# Patient Record
Sex: FEMALE | Race: BLACK OR AFRICAN AMERICAN | Employment: UNEMPLOYED | URBAN - METROPOLITAN AREA
[De-identification: names, ages, dates, MRNs, and addresses within clinical notes are randomized per-mention and may not be internally consistent; named-entity substitution may affect disease eponyms.]

---

## 2017-02-15 ENCOUNTER — TELEPHONE (OUTPATIENT)
Dept: NURSING | Facility: CLINIC | Age: 22
End: 2017-02-15

## 2017-02-15 NOTE — TELEPHONE ENCOUNTER
Call Type: Triage Call    Presenting Problem: Arlecia states she had a small amount of brown  vaginal discharge today. She says her menses should start tomorrow.  Denies pain, or any other symptoms now. Advised that is likely the  start of her cycle, one day sooner than expected.  Triage Note:  Guideline Title: Information Only Call; No Symptom Triage (Adult)  Recommended Disposition: Provide Information or Advice Only  Original Inclination: Wanted to speak with a nurse  Override Disposition:  Intended Action: Follow advice given  Physician Contacted: No  Health information question; person denies any symptoms, no triage required.  Information provided from approved references or clinical experience. ?  YES  Requesting regular office appointment ? NO  Sign(s) or symptom(s) associated with a diagnosed condition or with a new illness  ? NO  Requesting information about provider, services or community resources ? NO  Call back to complete assessment/clarification of information from prior caller to  complete triage ? NO  Requesting information and provider is best resource; no triage required. ? NO  Caller not with patient and is unable to provide clinical information about  patient to facilitate triage. ? NO  Requesting provider information for recently scheduled test, procedure; no triage  required. Needed information not available per approved resources or clinical  experience. ? NO  Requesting information not available per approved reference or clinical  experience; no triage required. ? NO  Requesting information regarding scheduled exam, test or procedure; no triage  required. Information provided from approved resources or clinical experience. ?  NO  Physician Instructions:  Care Advice:

## 2017-03-21 ENCOUNTER — TELEPHONE (OUTPATIENT)
Dept: NURSING | Facility: CLINIC | Age: 22
End: 2017-03-21

## 2017-03-21 NOTE — TELEPHONE ENCOUNTER
Call Type: Triage Call    Presenting Problem: Pt calling. States her period is a week late -  but had negative home preg test. No abd pain, vag dc/bleeding.  Denies any stress at home/work/school. States she did have  unprotected sex since LMP. Goes to [facility] for primary care.  Triage Note:  Krystal Brandon added this note on Mar 21 2017  2:24PM:  Advised to call her clinic and consult re: specific recommendations.  Reviewed worsening/emergent symptoms that would require immediate eval/ER.  Pt verbalized understanding and is agreeable to plan.  Guideline Title: Amenorrhea  Recommended Disposition: See Provider within 2 Weeks  Original Inclination: Wanted to speak with a nurse  Override Disposition:  Intended Action: Follow advice given  Physician Contacted: No  All other situations ?  YES  Possibly pregnant ? NO  Sexually active AND ectopic pregnancy risk factors ? NO  Pregnant or possibly pregnant AND abdominal pain ? NO  Concern with weight AND less than 90% of ideal body weight (100 lbs. for first 5  feet of height plus 5 lbs. for each additional inch) ? NO  Regular strenuous or heavy exercise ? NO  New or worsening signs and symptoms that may indicate shock ? NO  Any other abdominal pain ? NO  Age more than 40 years OR hysterectomy ? NO  Unbearable abdominal/pelvic pain ? NO  Three or more months without a period (previously having menses) AND not  previously evaluated ? NO  No period more than 3 months postpartum AND not breastfeeding ? NO  No period more than 3 months after stopping breastfeeding ? NO  No period less than 3 months postpartum or less than 3 months after stopping  breastfeeding ? NO  Taking oral contraceptives AND absence of menses on pill OR stopped oral  contraceptives within last 6 months ? NO  Under significant personal stress ? NO  Has not had a period by the age of by the age of 18 years or age of 16 years with  the absence of breast development. ? NO  Physician  Instructions:  Care Advice: If pregnancy is possible, do a home pregnancy test.  Call your  provider if the results are positive and to get further recommendations. It  is important that prenatal care is started as soon as possible if you are  pregnant.

## 2017-06-05 ENCOUNTER — APPOINTMENT (OUTPATIENT)
Dept: GENERAL RADIOLOGY | Facility: CLINIC | Age: 22
End: 2017-06-05
Attending: PHYSICIAN ASSISTANT
Payer: COMMERCIAL

## 2017-06-05 ENCOUNTER — HOSPITAL ENCOUNTER (EMERGENCY)
Facility: CLINIC | Age: 22
Discharge: HOME | End: 2017-06-05
Attending: PHYSICIAN ASSISTANT | Admitting: PHYSICIAN ASSISTANT
Payer: COMMERCIAL

## 2017-06-05 VITALS
SYSTOLIC BLOOD PRESSURE: 114 MMHG | TEMPERATURE: 98.1 F | HEART RATE: 86 BPM | OXYGEN SATURATION: 100 % | DIASTOLIC BLOOD PRESSURE: 55 MMHG | RESPIRATION RATE: 18 BRPM

## 2017-06-05 DIAGNOSIS — R07.9 CHEST PAIN, UNSPECIFIED TYPE: ICD-10-CM

## 2017-06-05 DIAGNOSIS — R10.84 ABDOMINAL PAIN, GENERALIZED: ICD-10-CM

## 2017-06-05 LAB
ALBUMIN SERPL-MCNC: 4 G/DL (ref 3.4–5)
ALP SERPL-CCNC: 105 U/L (ref 40–150)
ALT SERPL W P-5'-P-CCNC: 19 U/L (ref 0–50)
ANION GAP SERPL CALCULATED.3IONS-SCNC: 1 MMOL/L (ref 3–14)
AST SERPL W P-5'-P-CCNC: 19 U/L (ref 0–45)
BASOPHILS # BLD AUTO: 0.1 10E9/L (ref 0–0.2)
BASOPHILS NFR BLD AUTO: 1.1 %
BILIRUB SERPL-MCNC: 0.3 MG/DL (ref 0.2–1.3)
BUN SERPL-MCNC: 6 MG/DL (ref 7–30)
CALCIUM SERPL-MCNC: 9.3 MG/DL (ref 8.5–10.1)
CHLORIDE SERPL-SCNC: 107 MMOL/L (ref 94–109)
CO2 SERPL-SCNC: 31 MMOL/L (ref 20–32)
CREAT SERPL-MCNC: 0.57 MG/DL (ref 0.52–1.04)
D DIMER PPP FEU-MCNC: 0.4 UG/ML FEU (ref 0–0.5)
DIFFERENTIAL METHOD BLD: NORMAL
EOSINOPHIL # BLD AUTO: 0.1 10E9/L (ref 0–0.7)
EOSINOPHIL NFR BLD AUTO: 1.8 %
ERYTHROCYTE [DISTWIDTH] IN BLOOD BY AUTOMATED COUNT: 12.9 % (ref 10–15)
GFR SERPL CREATININE-BSD FRML MDRD: ABNORMAL ML/MIN/1.7M2
GLUCOSE SERPL-MCNC: 81 MG/DL (ref 70–99)
HCT VFR BLD AUTO: 38.5 % (ref 35–47)
HGB BLD-MCNC: 12.6 G/DL (ref 11.7–15.7)
IMM GRANULOCYTES # BLD: 0 10E9/L (ref 0–0.4)
IMM GRANULOCYTES NFR BLD: 0.2 %
LIPASE SERPL-CCNC: 92 U/L (ref 73–393)
LYMPHOCYTES # BLD AUTO: 2.5 10E9/L (ref 0.8–5.3)
LYMPHOCYTES NFR BLD AUTO: 40.9 %
MCH RBC QN AUTO: 27.3 PG (ref 26.5–33)
MCHC RBC AUTO-ENTMCNC: 32.7 G/DL (ref 31.5–36.5)
MCV RBC AUTO: 84 FL (ref 78–100)
MONOCYTES # BLD AUTO: 0.4 10E9/L (ref 0–1.3)
MONOCYTES NFR BLD AUTO: 7 %
NEUTROPHILS # BLD AUTO: 3 10E9/L (ref 1.6–8.3)
NEUTROPHILS NFR BLD AUTO: 49 %
NRBC # BLD AUTO: 0 10*3/UL
NRBC BLD AUTO-RTO: 0 /100
PLATELET # BLD AUTO: 206 10E9/L (ref 150–450)
POTASSIUM SERPL-SCNC: 4 MMOL/L (ref 3.4–5.3)
PROT SERPL-MCNC: 7.9 G/DL (ref 6.8–8.8)
RBC # BLD AUTO: 4.61 10E12/L (ref 3.8–5.2)
SODIUM SERPL-SCNC: 139 MMOL/L (ref 133–144)
TROPONIN I SERPL-MCNC: NORMAL UG/L (ref 0–0.04)
WBC # BLD AUTO: 6.1 10E9/L (ref 4–11)

## 2017-06-05 PROCEDURE — 84484 ASSAY OF TROPONIN QUANT: CPT | Performed by: PHYSICIAN ASSISTANT

## 2017-06-05 PROCEDURE — 80053 COMPREHEN METABOLIC PANEL: CPT | Performed by: PHYSICIAN ASSISTANT

## 2017-06-05 PROCEDURE — 85379 FIBRIN DEGRADATION QUANT: CPT | Performed by: PHYSICIAN ASSISTANT

## 2017-06-05 PROCEDURE — 99285 EMERGENCY DEPT VISIT HI MDM: CPT | Mod: 25

## 2017-06-05 PROCEDURE — 71020 XR CHEST 2 VW: CPT

## 2017-06-05 PROCEDURE — 25000128 H RX IP 250 OP 636: Performed by: PHYSICIAN ASSISTANT

## 2017-06-05 PROCEDURE — 83690 ASSAY OF LIPASE: CPT | Performed by: PHYSICIAN ASSISTANT

## 2017-06-05 PROCEDURE — 85025 COMPLETE CBC W/AUTO DIFF WBC: CPT | Performed by: PHYSICIAN ASSISTANT

## 2017-06-05 PROCEDURE — 93005 ELECTROCARDIOGRAM TRACING: CPT

## 2017-06-05 PROCEDURE — 96365 THER/PROPH/DIAG IV INF INIT: CPT

## 2017-06-05 RX ORDER — HYDROCODONE BITARTRATE AND ACETAMINOPHEN 5; 325 MG/1; MG/1
1-2 TABLET ORAL EVERY 4 HOURS PRN
Qty: 15 TABLET | Refills: 0 | Status: SHIPPED | OUTPATIENT
Start: 2017-06-05 | End: 2017-08-23

## 2017-06-05 RX ORDER — ONDANSETRON 4 MG/1
4 TABLET, ORALLY DISINTEGRATING ORAL EVERY 8 HOURS PRN
Qty: 10 TABLET | Refills: 0 | Status: SHIPPED | OUTPATIENT
Start: 2017-06-05 | End: 2017-06-08

## 2017-06-05 RX ORDER — SODIUM CHLORIDE 9 MG/ML
1000 INJECTION, SOLUTION INTRAVENOUS CONTINUOUS
Status: DISCONTINUED | OUTPATIENT
Start: 2017-06-05 | End: 2017-06-05 | Stop reason: HOSPADM

## 2017-06-05 RX ORDER — CEFTRIAXONE 1 G/1
1 INJECTION, POWDER, FOR SOLUTION INTRAMUSCULAR; INTRAVENOUS ONCE
Status: COMPLETED | OUTPATIENT
Start: 2017-06-05 | End: 2017-06-05

## 2017-06-05 RX ADMIN — SODIUM CHLORIDE 100 ML: 900 INJECTION INTRAVENOUS at 16:01

## 2017-06-05 RX ADMIN — CEFTRIAXONE SODIUM 1 G: 1 INJECTION, POWDER, FOR SOLUTION INTRAMUSCULAR; INTRAVENOUS at 16:01

## 2017-06-05 ASSESSMENT — ENCOUNTER SYMPTOMS
FLANK PAIN: 1
COUGH: 0
BACK PAIN: 1
NAUSEA: 0
DYSURIA: 0
ABDOMINAL PAIN: 1
DIARRHEA: 1
APPETITE CHANGE: 0
SHORTNESS OF BREATH: 0
VOMITING: 0
FEVER: 0

## 2017-06-05 NOTE — ED AVS SNAPSHOT
[facility] Emergency Department    [address]    [facility] [address]    Phone:  [phone]    Fax:  [phone]                                       Kina Walsh   MRN: [id number]    Department:  [facility] Emergency Department   Date of Visit:  6/5/2017           After Visit Summary Signature Page     I have received my discharge instructions, and my questions have been answered. I have discussed any challenges I see with this plan with the nurse or doctor.    ..........................................................................................................................................  Patient/Patient Representative Signature      ..........................................................................................................................................  Patient Representative Print Name and Relationship to Patient    ..................................................               ................................................  Date                                            Time    ..........................................................................................................................................  Reviewed by Signature/Title    ...................................................              ..............................................  Date                                                            Time

## 2017-06-05 NOTE — ED NOTES
Patient presents to the ED with lower back and abdominal pain. Symptoms intermittently for the past few days.

## 2017-06-05 NOTE — DISCHARGE INSTRUCTIONS
You can try using antacids if you'd like to see if that helps your abdominal pain. Follow up with your doctor later this week or early next week for recheck. Return here with concerns.     Abdominal Pain  Abdominal pain is pain in the stomach or intestinal area. Everyone has this pain from time to time. In many cases it goes away on its own. But abdominal pain can sometimes be due to a serious problem, such as appendicitis. So it s important to know when to seek help.  Causes of abdominal pain  There are many possible causes of abdominal pain. Common causes in adults include:    Constipation, diarrhea, or gas    GERD (gastroesophageal reflux disease) movement of stomach acid into the esophagus, also known as acid reflux or heartburn    Peptic ulcer (a sore in the lining of the stomach or small intestine)    Inflammation of the gallbladder, liver, or pancreas    Gallstones or kidney stones    Appendicitis     Obstruction of the intestines     Hernia (bulging of an internal organ through a muscle or other tissue)    Urinary tract infections    In women, menstrual cramps, fibroids, or endometriosis of the uterus    Inflammation or infection of the intestines  Diagnosing the cause of abdominal pain  Your health care provider will examine you to help find the cause of your pain. If needed, tests will be ordered. Because abdominal pain has so many possible causes, it can be hard to discover the reason for the pain. Giving details about your pain can help. Be ready to tell your health care provider where and when you feel the pain and what makes it better or worse. Also mention whether you have other symptoms such as fever, tiredness, nausea, vomiting, or changes in bathroom habits.  Treating abdominal pain  Certain causes of pain, such as appendicitis or a bowel obstruction, need emergency treatment. Other problems can be treated with rest, fluids, or medications. Your health care provider can give you specific  instructions for treatment or self-care based on the cause of your pain.  If you have vomiting or diarrhea, sip water or other clear fluids. When you are ready to eat solid foods again, start with small amounts of easy-to-digest, low-fat foods, such as applesauce, toast, or crackers.   When to call the doctor  Call 911 or go to the hospital right away if you:    Can t pass stool and are vomiting    Are vomiting blood or have black, tarry diarrhea    Also have chest, neck, or shoulder pain    Feel like you are about to pass out    Have pain in your shoulder blades with nausea    Have sudden, excruciating abdominal pain    Have new, severe pain unlike any you have felt before    Have a belly that is rigid, hard, and tender to touch  Call your doctor if you have:    Pain for more than 5 days    Bloating for more than 2 days    Diarrhea for more than 5 days    Fever of 101 F (38.3 C) or higher    Pain that continues to worsen    Unexplained weight loss    Continued lack of appetite    Blood in the stool  How to prevent abdominal pain  Here are some tips to help prevent abdominal pain:    Eat smaller amounts of food at one time.    Avoid greasy, fried, or other high-fat foods.    Avoid foods that give you gas.    Exercise regularly.    Drink plenty of fluids.  To help prevent symptoms of gastroesophageal reflux disease (GERD):    Quit smoking.    Reduce alcohol and certain foods that increase stomach acid.     Lose excess weight.    Finish eating at least 2 hours before you go to bed or lie down.    Elevate the head of your bed.    [id number] [employer]. [address] [place] [address], [place] [address]. All rights reserved. This information is not intended as a substitute for professional medical care. Always follow your healthcare professional's instructions.

## 2017-06-05 NOTE — ED PROVIDER NOTES
History     Chief Complaint:  Back and Abdominal Pain also chest pain      HPI   Kina Walsh is a 22 year old female with a history of HIV with no viral load on testing performed end of April 2017 who presents with back and abdominal pain as well as chest pain.   The patient states she has had odorous urine for the past month and was diagnosed with a UTI yesterday at OBGYN's office. They gave her prescription for medication to treat this but she has not started it yet. For the past 2 days she has had all over back and abdominal pain, worse in the left mid back and upper abdomen. She has had the upper abdominal pain before but never had the back pain. LMP was last week. She denies vaginal bleeding or discharge, dysuria, vomiting, nausea. The patient had diarrhea last night but none since then. Food does not change the pain.     The patient also has had chest pain since last night that is intermittent. She denies shortness of breath, worsening of chest pain with exertion. She denies personal or family history of cardiac issues. She denies leg swelling, recent travel, surgeries or period of immobilizations, history of blood clotting or bleeding disorders in her or family. She did use an estrogen birth control patch for 1 day last month as birth control but then it fell off in the shower and she did not put another one on.     UA performed at OBGYN clinic on 6/2/17: Hazy, LE trace, blood small, Epithelial cells few, Bacteria moderate  UPT: negative  Negative wet prep    Allergies:  No known allergies     Medications:    Triumeq    Past Medical History:    Asymptomatic HIV    Past Surgical History:    No past surgical history on file.    Family History:    No family history on file.    Social History:  The patient presents with significant other  She is a current everyday smoker and drinks alcohol daily  Marital Status:  Single [1]     Review of Systems   Constitutional: Negative for appetite change and fever.    Respiratory: Negative for cough and shortness of breath.    Cardiovascular: Positive for chest pain.   Gastrointestinal: Positive for abdominal pain and diarrhea. Negative for nausea and vomiting.   Genitourinary: Positive for flank pain. Negative for dysuria, vaginal bleeding and vaginal discharge.   Musculoskeletal: Positive for back pain.   All other systems reviewed and are negative.    Physical Exam   First Vitals:  BP: 117/71  Pulse: 86  Temp: 98.1  F (36.7  C)  Resp: 18  SpO2: 96 %    Physical Exam  Constitutional: Alert, attentive  HENT:    Nose: Nose normal.    Mouth/Throat: Oropharynx is clear, mucous membranes are moist   Eyes: EOM are normal. Pupils are equal, round, and reactive to light.   CV: regular rate and rhythm  Chest: Effort normal and breath sounds normal.   GI:  There is generalized abdominal tenderness worse in the epigastrium. No distension. Normal bowel sounds  MSK: Normal range of motion. No leg swelling.   Neurological: Alert, attentive  Skin: Skin is warm and dry.     Emergency Department Course   ECG:  Indication: chest pain Rate 74 bpm. ID interval 148. QRS duration 98. QT/QTc 382/424. P-R-T axes 68 72 38. Normal sinus rhythm with sinus arrhythmia. Normal ECG. Performed at 1408. Read by Dr. Crystal at 1450.       Imaging:  Radiographic findings were communicated with the patient and family who voiced understanding of the findings.  CXR: The cardiac silhouette and pulmonary vasculature are normal. No evidence of pneumothorax or pleural effusion. The lungs are clear, per radiology.     Laboratory:  CBC: WNL(WBC 6.1, HGB 12.6, Plts 206)  CMP: Anion gap 1 low, BUN 6 low, o/w WNL(Creatinine 0.57)  Lipase: 92  D dimer: 0.4  Troponin: <0.015    Interventions:  NS 1 L IV  Ceftriaxone 1 g IV    Emergency Department Course:  I evaluated the patient and discussed a plan of care with the patient and significant other  IV inserted and blood drawn. The patient was placed on continuous cardiac  monitoring and pulse oximetry.   The patient was sent for the following imaging: CXR. See results above.     Rechecked the patient, findings and plan explained to the patient. Patient discharged home, status improved, with instructions regarding supportive care, medications, and reasons to return as well as the importance of close follow-up was reviewed.     Impression & Plan      Medical Decision Making:  Kina Walsh is a 22 year old female who presents with chest pain and abdominal pain.  The work up in the Emergency Department is negative.  I considered a broad differential diagnosis in this patient including life-threatening etiologies such as acute coronary syndrome, myocardial infarction, pulmonary embolism, acute aortic dissection, myocarditis, pericarditis amongst others.  Other causes considered for this patient included pneumonia, pneumothorax, chest wall source, pericarditis, pleurisy, esophageal spasm, etc.  No serious etiology for the chest pain were detected today during this visit. The patient is very low risk with heart score of zero and with a negative troponin and EKG showing no acute changes, I do not believe this pain is from ACS. D dimer is negative and doubt PE. CXR is negative. She also has abdominal and back pain as well as urinary frequency. She had a positive UA at her OBGYN's clinic a few days ago (see above and/or [facility] records) and was called yesterday with notification of prescription for Keflex that was sent to her pharmacy. She did fill this but hasn't start it yet. I did not repeat urine testing today but did give a dose of IV Rocephin and the patient will start the Keflex tomorrow. Her labs are normal without white count concerning for acute intraabdominal etiology. The patient has normal kidney function and I doubt pyelonephritis is causing her pain. She has normal vital signs here. No fevers. No vaginal bleeding or discharge so did not feel pelvic  exam was indicated. I discussed performing CT of the abdomen but she declined after going over risks and benefits. She does not feel her pain is bad enough and given her reassuring workup, I think this is reasonable to hold off. I gave her warning signs to watch for and return here if she experiences. She will follow up with her PCP in the next few days and return here as needed. I do not feel she needs to be admitted at this time. I suggested, given epigastric tenderness, that she try antacids as needed to see if that helps as this could be ulcer or GERD. She is discharged with her significant other in stable condition.     Diagnosis:    ICD-10-CM    1. Abdominal pain, generalized R10.84    2. Chest pain, unspecified type R07.9        Disposition:  discharged to home with significant other    Discharge Medications:  Discharge Medication List as of 6/5/2017  4:24 PM      START taking these medications    Details   HYDROcodone-acetaminophen (NORCO) 5-325 MG per tablet Take 1-2 tablets by mouth every 4 hours as needed for moderate to severe pain, Disp-15 tablet, R-0, Local Print      ondansetron (ZOFRAN ODT) 4 MG ODT tab Take 1 tablet (4 mg) by mouth every 8 hours as needed for nausea, Disp-10 tablet, R-0, Local Print               Kasie Esteves  6/5/2017   [facility] EMERGENCY DEPARTMENT       Kasie Esteves PA-C  06/05/17 1811       Kasie Esteves PA-C  06/05/17 1812

## 2017-06-05 NOTE — ED AVS SNAPSHOT
[facility] Emergency Department    [address]    [facility] [address]    Phone:  [phone]    Fax:  [phone]                                       Kina Walsh   MRN: [id number]    Department:  [facility] Emergency Department   Date of Visit:  6/5/2017           Patient Information     Date Of Birth          1995        Your diagnoses for this visit were:     Abdominal pain, generalized     Chest pain, unspecified type        You were seen by Kasie Esteves PA-C.      Follow-up Information     Follow up with [facility] In 3 days.    Why:  As needed    Contact information:    [address] [facility] [address]          Follow up with [facility] Emergency Department.    Specialty:  EMERGENCY MEDICINE    Why:  If symptoms worsen    Contact information:    [address] [facility] [address]  [phone]        Discharge Instructions       You can try using antacids if you'd like to see if that helps your abdominal pain. Follow up with your doctor later this week or early next week for recheck. Return here with concerns.     Abdominal Pain  Abdominal pain is pain in the stomach or intestinal area. Everyone has this pain from time to time. In many cases it goes away on its own. But abdominal pain can sometimes be due to a serious problem, such as appendicitis. So it s important to know when to seek help.  Causes of abdominal pain  There are many possible causes of abdominal pain. Common causes in adults include:    Constipation, diarrhea, or gas    GERD (gastroesophageal reflux disease) movement of stomach acid into the esophagus, also known as acid reflux or heartburn    Peptic ulcer (a sore in the lining of the stomach or small intestine)    Inflammation of the gallbladder, liver, or pancreas    Gallstones or kidney stones    Appendicitis     Obstruction of the intestines     Hernia (bulging  of an internal organ through a muscle or other tissue)    Urinary tract infections    In women, menstrual cramps, fibroids, or endometriosis of the uterus    Inflammation or infection of the intestines  Diagnosing the cause of abdominal pain  Your health care provider will examine you to help find the cause of your pain. If needed, tests will be ordered. Because abdominal pain has so many possible causes, it can be hard to discover the reason for the pain. Giving details about your pain can help. Be ready to tell your health care provider where and when you feel the pain and what makes it better or worse. Also mention whether you have other symptoms such as fever, tiredness, nausea, vomiting, or changes in bathroom habits.  Treating abdominal pain  Certain causes of pain, such as appendicitis or a bowel obstruction, need emergency treatment. Other problems can be treated with rest, fluids, or medications. Your health care provider can give you specific instructions for treatment or self-care based on the cause of your pain.  If you have vomiting or diarrhea, sip water or other clear fluids. When you are ready to eat solid foods again, start with small amounts of easy-to-digest, low-fat foods, such as applesauce, toast, or crackers.   When to call the doctor  Call 911 or go to the hospital right away if you:    Can t pass stool and are vomiting    Are vomiting blood or have black, tarry diarrhea    Also have chest, neck, or shoulder pain    Feel like you are about to pass out    Have pain in your shoulder blades with nausea    Have sudden, excruciating abdominal pain    Have new, severe pain unlike any you have felt before    Have a belly that is rigid, hard, and tender to touch  Call your doctor if you have:    Pain for more than 5 days    Bloating for more than 2 days    Diarrhea for more than 5 days    Fever of 101 F (38.3 C) or higher    Pain that continues to worsen    Unexplained weight loss    Continued lack  of appetite    Blood in the stool  How to prevent abdominal pain  Here are some tips to help prevent abdominal pain:    Eat smaller amounts of food at one time.    Avoid greasy, fried, or other high-fat foods.    Avoid foods that give you gas.    Exercise regularly.    Drink plenty of fluids.  To help prevent symptoms of gastroesophageal reflux disease (GERD):    Quit smoking.    Reduce alcohol and certain foods that increase stomach acid.     Lose excess weight.    Finish eating at least 2 hours before you go to bed or lie down.    Elevate the head of your bed.    2909-5220 [employer]. [address] [place] [address]. All rights reserved. This information is not intended as a substitute for professional medical care. Always follow your healthcare professional's instructions.          Discharge References/Attachments     CHEST PAIN, NONCARDIAC  (ENGLISH)      24 Hour Appointment Hotline       To make an appointment at any [facility] call [phone] ([phone]). If you don't have a family doctor or clinic, we will help you find one. [facility] are conveniently located to serve the needs of you and your family.             Review of your medicines      START taking        Dose / Directions Last dose taken    HYDROcodone-acetaminophen 5-325 MG per tablet   Commonly known as:  NORCO   Dose:  1-2 tablet   Quantity:  15 tablet        Take 1-2 tablets by mouth every 4 hours as needed for moderate to severe pain   Refills:  0        ondansetron 4 MG ODT tab   Commonly known as:  ZOFRAN ODT   Dose:  4 mg   Quantity:  10 tablet        Take 1 tablet (4 mg) by mouth every 8 hours as needed for nausea   Refills:  0          Our records show that you are taking the medicines listed below. If these are incorrect, please call your family doctor or clinic.        Dose / Directions Last dose taken    * TRIUMEQ 600- MG per tablet   Dose:  1 tablet   Generic drug:   abacavir-dolutegravir-LamiVUDine        Take 1 tablet by mouth daily   Refills:  0        * abacavir-dolutegravir-LamiVUDine 600- MG per tablet   Commonly known as:  TRIUMEQ        Refills:  0        * Notice:  This list has 2 medication(s) that are the same as other medications prescribed for you. Read the directions carefully, and ask your doctor or other care provider to review them with you.            Prescriptions were sent or printed at these locations (2 Prescriptions)                   Other Prescriptions                Printed at Department/Unit printer (2 of 2)         HYDROcodone-acetaminophen (NORCO) 5-325 MG per tablet               ondansetron (ZOFRAN ODT) 4 MG ODT tab                Procedures and tests performed during your visit     CBC with platelets differential    Chest XR,  PA & LAT    Comprehensive metabolic panel    D dimer quantitative    EKG 12-lead, tracing only    Lipase    Peripheral IV catheter    Troponin I      Orders Needing Specimen Collection     None      Pending Results     Date and Time Order Name Status Description    6/5/2017 1347 EKG 12-lead, tracing only Preliminary             Pending Culture Results     No orders found from 6/3/2017 to 6/6/2017.            Pending Results Instructions     If you had any lab results that were not finalized at the time of your Discharge, you can call the ED Lab Result RN at [phone]. You will be contacted by this team for any positive Lab results or changes in treatment. The nurses are available 7 days a week from 10A to 6:30P.  You can leave a message 24 hours per day and they will return your call.        Test Results From Your Hospital Stay        6/5/2017  2:28 PM      Component Results     Component Value Ref Range & Units Status    WBC 6.1 4.0 - 11.0 10e9/L Final    RBC Count 4.61 3.8 - 5.2 10e12/L Final    Hemoglobin 12.6 11.7 - 15.7 g/dL Final    Hematocrit 38.5 35.0 - 47.0 % Final    MCV 84 78 - 100 fl Final    MCH 27.3  26.5 - 33.0 pg Final    MCHC 32.7 31.5 - 36.5 g/dL Final    RDW 12.9 10.0 - 15.0 % Final    Platelet Count 206 150 - 450 10e9/L Final    Diff Method Automated Method  Final    % Neutrophils 49.0 % Final    % Lymphocytes 40.9 % Final    % Monocytes 7.0 % Final    % Eosinophils 1.8 % Final    % Basophils 1.1 % Final    % Immature Granulocytes 0.2 % Final    Nucleated RBCs 0 0 /100 Final    Absolute Neutrophil 3.0 1.6 - 8.3 10e9/L Final    Absolute Lymphocytes 2.5 0.8 - 5.3 10e9/L Final    Absolute Monocytes 0.4 0.0 - 1.3 10e9/L Final    Absolute Eosinophils 0.1 0.0 - 0.7 10e9/L Final    Absolute Basophils 0.1 0.0 - 0.2 10e9/L Final    Abs Immature Granulocytes 0.0 0 - 0.4 10e9/L Final    Absolute Nucleated RBC 0.0  Final         6/5/2017  2:40 PM      Component Results     Component Value Ref Range & Units Status    D Dimer 0.4 0.0 - 0.50 ug/ml FEU Final    This D-dimer assay is intended for use in conjuntion with a clinical pretest   probability assessment model to exclude pulmonary embolism (PE) and as an aid   in the diagnosis of deep venous thrombosis (DVT) in outpatients suspected of   PE   or DVT. The cut-off value is 0.5 g/mL FEU.           6/5/2017  2:49 PM      Component Results     Component Value Ref Range & Units Status    Sodium 139 133 - 144 mmol/L Final    Potassium 4.0 3.4 - 5.3 mmol/L Final    Chloride 107 94 - 109 mmol/L Final    Carbon Dioxide 31 20 - 32 mmol/L Final    Anion Gap 1 (L) 3 - 14 mmol/L Final    Glucose 81 70 - 99 mg/dL Final    Urea Nitrogen 6 (L) 7 - 30 mg/dL Final    Creatinine 0.57 0.52 - 1.04 mg/dL Final    GFR Estimate >90  Non  GFR Calc   >60 mL/min/1.7m2 Final    GFR Estimate If Black >90   GFR Calc   >60 mL/min/1.7m2 Final    Calcium 9.3 8.5 - 10.1 mg/dL Final    Bilirubin Total 0.3 0.2 - 1.3 mg/dL Final    Albumin 4.0 3.4 - 5.0 g/dL Final    Protein Total 7.9 6.8 - 8.8 g/dL Final    Alkaline Phosphatase 105 40 - 150 U/L Final    ALT 19 0 - 50  U/L Final    AST 19 0 - 45 U/L Final         6/5/2017  2:49 PM      Component Results     Component Value Ref Range & Units Status    Lipase 92 73 - 393 U/L Final         6/5/2017  2:49 PM      Component Results     Component Value Ref Range & Units Status    Troponin I ES  0.000 - 0.045 ug/L Final    <0.015  The 99th percentile for upper reference range is 0.045 ug/L.  Troponin values in   the range of 0.045 - 0.120 ug/L may be associated with risks of adverse   clinical events.           6/5/2017  3:52 PM      Narrative     XR CHEST 2 VW 6/5/2017 3:24 PM     HISTORY: chest pain    COMPARISON: None        Impression     IMPRESSION: The cardiac silhouette and pulmonary vasculature are  normal. No evidence of pneumothorax or pleural effusion. The lungs are  clear.    JUAN YIN MD                Clinical Quality Measure: Blood Pressure Screening     Your blood pressure was checked while you were in the emergency department today. The last reading we obtained was  BP: 114/55 . Please read the guidelines below about what these numbers mean and what you should do about them.  If your systolic blood pressure (the top number) is less than 120 and your diastolic blood pressure (the bottom number) is less than 80, then your blood pressure is normal. There is nothing more that you need to do about it.  If your systolic blood pressure (the top number) is 120-139 or your diastolic blood pressure (the bottom number) is 80-89, your blood pressure may be higher than it should be. You should have your blood pressure rechecked within a year by a primary care provider.  If your systolic blood pressure (the top number) is 140 or greater or your diastolic blood pressure (the bottom number) is 90 or greater, you may have high blood pressure. High blood pressure is treatable, but if left untreated over time it can put you at risk for heart attack, stroke, or kidney failure. You should have your blood pressure rechecked by a primary  care provider within the next 4 weeks.  If your provider in the emergency department today gave you specific instructions to follow-up with your doctor or provider even sooner than that, you should follow that instruction and not wait for up to 4 weeks for your follow-up visit.        Thank you for choosing [facility]       Thank you for choosing [facility] for your care. Our goal is always to provide you with excellent care. Hearing back from our patients is one way we can continue to improve our services. Please take a few minutes to complete the written survey that you may receive in the mail after you visit with us. Thank you!        [employer] Information     [employer] gives you secure access to your electronic health record. If you see a primary care provider, you can also send messages to your care team and make appointments. If you have questions, please call your primary care clinic.  If you do not have a primary care provider, please call [phone] and they will assist you.        Care EveryWhere ID     This is your Care EveryWhere ID. This could be used by other organizations to access your [facility] medical records  [id number]        After Visit Summary       This is your record. Keep this with you and show to your community pharmacist(s) and doctor(s) at your next visit.

## 2017-06-06 LAB — INTERPRETATION ECG - MUSE: NORMAL

## 2017-07-22 ENCOUNTER — HEALTH MAINTENANCE LETTER (OUTPATIENT)
Age: 22
End: 2017-07-22

## 2017-08-23 ENCOUNTER — HOSPITAL ENCOUNTER (EMERGENCY)
Facility: CLINIC | Age: 22
Discharge: HOME | End: 2017-08-23
Attending: EMERGENCY MEDICINE | Admitting: EMERGENCY MEDICINE
Payer: COMMERCIAL

## 2017-08-23 VITALS
OXYGEN SATURATION: 99 % | HEART RATE: 92 BPM | SYSTOLIC BLOOD PRESSURE: 120 MMHG | DIASTOLIC BLOOD PRESSURE: 69 MMHG | TEMPERATURE: 99.2 F | RESPIRATION RATE: 16 BRPM

## 2017-08-23 DIAGNOSIS — L02.91 CUTANEOUS ABSCESS, UNSPECIFIED SITE: ICD-10-CM

## 2017-08-23 DIAGNOSIS — J02.0 ACUTE STREPTOCOCCAL PHARYNGITIS: ICD-10-CM

## 2017-08-23 LAB
DEPRECATED S PYO AG THROAT QL EIA: ABNORMAL
SPECIMEN SOURCE: ABNORMAL

## 2017-08-23 PROCEDURE — 25000132 ZZH RX MED GY IP 250 OP 250 PS 637: Performed by: EMERGENCY MEDICINE

## 2017-08-23 PROCEDURE — 87880 STREP A ASSAY W/OPTIC: CPT | Performed by: EMERGENCY MEDICINE

## 2017-08-23 PROCEDURE — 99284 EMERGENCY DEPT VISIT MOD MDM: CPT | Mod: 25

## 2017-08-23 PROCEDURE — 10060 I&D ABSCESS SIMPLE/SINGLE: CPT

## 2017-08-23 PROCEDURE — 25000130 H RX MED GY IP 250 OP 259 PS 637: Performed by: EMERGENCY MEDICINE

## 2017-08-23 RX ORDER — IBUPROFEN 600 MG/1
600 TABLET, FILM COATED ORAL ONCE
Status: COMPLETED | OUTPATIENT
Start: 2017-08-23 | End: 2017-08-23

## 2017-08-23 RX ORDER — ACETAMINOPHEN 325 MG/1
650 TABLET ORAL ONCE
Status: COMPLETED | OUTPATIENT
Start: 2017-08-23 | End: 2017-08-23

## 2017-08-23 RX ADMIN — ACETAMINOPHEN 650 MG: 325 TABLET, FILM COATED ORAL at 03:34

## 2017-08-23 RX ADMIN — AMOXICILLIN AND CLAVULANATE POTASSIUM 1 TABLET: 875; 125 TABLET, FILM COATED ORAL at 03:21

## 2017-08-23 RX ADMIN — IBUPROFEN 600 MG: 600 TABLET ORAL at 03:34

## 2017-08-23 RX ADMIN — Medication 10 MG: at 03:22

## 2017-08-23 ASSESSMENT — ENCOUNTER SYMPTOMS
COLOR CHANGE: 1
ABDOMINAL PAIN: 0
FEVER: 0
COUGH: 1
DIARRHEA: 0
NAUSEA: 1
VOMITING: 0
SORE THROAT: 1

## 2017-08-23 NOTE — ED AVS SNAPSHOT
[facility] Emergency Department    [address] [place] [address]    Phone:  [phone]    Fax:  [phone]                                       Kina Walsh   MRN: [id number]    Department:  [facility] Emergency Department   Date of Visit:  8/23/2017           Patient Information     Date Of Birth          1995        Your diagnoses for this visit were:     Acute streptococcal pharyngitis     Cutaneous abscess, unspecified site        You were seen by Gucci Mccall MD.        Discharge Instructions       Please make an appointment to follow up with your primary care provider in 3-5 days if not improving.        Abscess (Incision & Drainage)  An abscess is sometimes called a boil. It happens when bacteria get trapped under the skin and start to grow. Pus forms inside the abscess as the body responds to the bacteria. An abscess can happen with an insect bite, ingrown hair, blocked oil gland, pimple, cyst, or puncture wound.  Your healthcare provider has drained the pus from your abscess. If the abscess pocket was large, your healthcare provider may have put in gauze packing. Your provider will need to remove it on your next visit. He or she may also replace it at that time. You may not need antibiotics to treat a simple abscess, unless the infection is spreading into the skin around the wound (cellulitis).  The wound will take about 1 to 2 weeks to heal, depending on the size of the abscess. Healthy tissue will grow from the bottom and sides of the opening until it seals over.  Home care  These tips can help your wound heal:    The wound may drain for the first 2 days. Cover the wound with a clean dry dressing. Change the dressing if it becomes soaked with blood or pus.    If a gauze packing was placed inside the abscess pocket, you may be told to remove it yourself. You may do this in the shower. Once the packing is removed, you should wash the  area in the shower, or clean the area as directed by your provider. Continue to do this until the skin opening has closed. Make sure you wash your hands after changing the packing or cleaning the wound.    If you were prescribed antibiotics, take them as directed until they are all gone.    You may use acetaminophen or ibuprofen to control pain, unless another pain medicine was prescribed. If you have liver disease or ever had a stomach ulcer, talk with your doctor before using these medicines.  Follow-up care  Follow up with your healthcare provider, or as advised. If a gauze packing was put in your wound, it should be removed in 1 to 2 days. Check your wound every day for any signs that the infection is getting worse. The signs are listed below.  When to seek medical advice  Call your healthcare provider right away if any of these occur:    Increasing redness or swelling    Red streaks in the skin leading away from the wound    Increasing local pain or swelling    Continued pus draining from the wound 2 days after treatment    Fever of 100.4 F (38 C) or higher, or as directed by your healthcare provider    Boil returns when you are at home  Date Last Reviewed: 9/1/2016 2000-2017 [employer]. [address] [place] [address]. All rights reserved. This information is not intended as a substitute for professional medical care. Always follow your healthcare professional's instructions.          Pharyngitis: Strep (Confirmed)    You have had a positive test for strep throat. Strep throat is a contagious illness. It is spread by coughing, kissing or by touching others after touching your mouth or nose. Symptoms include throat pain that is worse with swallowing, aching all over, headache, and fever. It is treated with antibiotic medicine. This should help you start to feel better in 1 to 2 days.  Home care    Rest at home. Drink plenty of fluids to you won't get dehydrated.    No work or school  for the first 2 days of taking the antibiotics. After this time, you will not be contagious. You can then return to school or work if you are feeling better.     Take antibiotic medicine for the full 10 days, even if you feel better. This is very important to ensure the infection is treated. It is also important to prevent medicine-resistant germs from developing. If you were given an antibiotic shot, you don't need any more antibiotics.    You may use acetaminophen or ibuprofen to control pain or fever, unless another medicine was prescribed for this. Talk with your doctor before taking these medicines if you have chronic liver or kidney disease. Also talk with your doctor if you have had a stomach ulcer or GI bleeding.    Throat lozenges or sprays help reduce pain. Gargling with warm saltwater will also reduce throat pain. Dissolve 1/2 teaspoon of salt in 1 glass of warm water. This may be useful just before meals.     Soft foods are OK. Avoid salty or spicy foods.  Follow-up care  Follow up with your healthcare provider or our staff if you don't get better over the next week.  When to seek medical advice  Call your healthcare provider right away if any of these occur:    Fever of 100.4 F (38 C) or higher, or as directed by your healthcare provider    New or worsening ear pain, sinus pain, or headache    Painful lumps in the back of neck    Stiff neck    Lymph nodes getting larger or becoming soft in the middle    You can't swallow liquids or you can't open your mouth wide because of throat pain    Signs of dehydration. These include very dark urine or no urine, sunken eyes, and dizziness.    Trouble breathing or noisy breathing    Muffled voice    Rash  Date Last Reviewed: 4/13/2015 2000-2017 [employer]. [address] [place] [address]. All rights reserved. This information is not intended as a substitute for professional medical care. Always follow your healthcare professional's  instructions.            24 Hour Appointment Hotline       To make an appointment at any [facility], call [phone] ([phone]). If you don't have a family doctor or clinic, we will help you find one. [facility] are conveniently located to serve the needs of you and your family.             Review of your medicines      START taking        Dose / Directions Last dose taken    amoxicillin-clavulanate 875-125 MG per tablet   Commonly known as:  AUGMENTIN   Dose:  1 tablet   Indication:  strep   Quantity:  9 tablet        Take 1 tablet by mouth 2 times daily for 9 doses   Refills:  0          Our records show that you are taking the medicines listed below. If these are incorrect, please call your family doctor or clinic.        Dose / Directions Last dose taken    * TRIUMEQ 600- MG per tablet   Dose:  1 tablet   Generic drug:  abacavir-dolutegravir-LamiVUDine        Take 1 tablet by mouth daily   Refills:  0        * abacavir-dolutegravir-LamiVUDine 600- MG per tablet   Commonly known as:  TRIUMEQ        Refills:  0        * Notice:  This list has 2 medication(s) that are the same as other medications prescribed for you. Read the directions carefully, and ask your doctor or other care provider to review them with you.            Prescriptions were sent or printed at these locations (1 Prescription)                   Other Prescriptions                Printed at Department/Unit printer (1 of 1)         amoxicillin-clavulanate (AUGMENTIN) 875-125 MG per tablet                Procedures and tests performed during your visit     Rapid strep screen      Orders Needing Specimen Collection     None      Pending Results     No orders found from 8/21/2017 to 8/24/2017.            Pending Culture Results     No orders found from 8/21/2017 to 8/24/2017.            Pending Results Instructions     If you had any lab results that were not finalized at the time of your Discharge, you can call the ED  Lab Result RN at [phone]. You will be contacted by this team for any positive Lab results or changes in treatment. The nurses are available 7 days a week from 10A to 6:30P.  You can leave a message 24 hours per day and they will return your call.        Test Results From Your Hospital Stay        8/23/2017  2:40 AM      Component Results     Component    Specimen Description    Throat    Rapid Strep A Screen (Abnormal)    POSITIVE: Group A Streptococcal antigen detected by immunoassay.                Clinical Quality Measure: Blood Pressure Screening     Your blood pressure was checked while you were in the emergency department today. The last reading we obtained was  BP: 119/72 . Please read the guidelines below about what these numbers mean and what you should do about them.  If your systolic blood pressure (the top number) is less than 120 and your diastolic blood pressure (the bottom number) is less than 80, then your blood pressure is normal. There is nothing more that you need to do about it.  If your systolic blood pressure (the top number) is 120-139 or your diastolic blood pressure (the bottom number) is 80-89, your blood pressure may be higher than it should be. You should have your blood pressure rechecked within a year by a primary care provider.  If your systolic blood pressure (the top number) is 140 or greater or your diastolic blood pressure (the bottom number) is 90 or greater, you may have high blood pressure. High blood pressure is treatable, but if left untreated over time it can put you at risk for heart attack, stroke, or kidney failure. You should have your blood pressure rechecked by a primary care provider within the next 4 weeks.  If your provider in the emergency department today gave you specific instructions to follow-up with your doctor or provider even sooner than that, you should follow that instruction and not wait for up to 4 weeks for your follow-up visit.        Thank you for  choosing [facility]       Thank you for choosing [facility] for your care. Our goal is always to provide you with excellent care. Hearing back from our patients is one way we can continue to improve our services. Please take a few minutes to complete the written survey that you may receive in the mail after you visit with us. Thank you!        [employer] Information     [employer] gives you secure access to your electronic health record. If you see a primary care provider, you can also send messages to your care team and make appointments. If you have questions, please call your primary care clinic.  If you do not have a primary care provider, please call [phone] and they will assist you.        Care EveryWhere ID     This is your Care EveryWhere ID. This could be used by other organizations to access your [facility] medical records  [id number]        Equal Access to Services     DILSHAD TERRAZAS : Colten Weinberg, tangela granados, umair comer, lupe montanez. So [facility] [phone].    ATENCIÓN: Si habla español, tiene a kennedy disposición servicios gratuitos de asistencia lingüística. Llame al [phone].    We comply with applicable federal civil rights laws and [place] laws. We do not discriminate on the basis of race, color, national origin, age, disability sex, sexual orientation or gender identity.            After Visit Summary       This is your record. Keep this with you and show to your community pharmacist(s) and doctor(s) at your next visit.

## 2017-08-23 NOTE — LETTER
08/23/17      To Whom it may concern:    _________________________ was in our Emergency Department today, 08/23/17. with a patient who needed their assistance.  Please excuse them from work/school.      Sincerely,

## 2017-08-23 NOTE — DISCHARGE INSTRUCTIONS
Please make an appointment to follow up with your primary care provider in 3-5 days if not improving.        Abscess (Incision & Drainage)  An abscess is sometimes called a boil. It happens when bacteria get trapped under the skin and start to grow. Pus forms inside the abscess as the body responds to the bacteria. An abscess can happen with an insect bite, ingrown hair, blocked oil gland, pimple, cyst, or puncture wound.  Your healthcare provider has drained the pus from your abscess. If the abscess pocket was large, your healthcare provider may have put in gauze packing. Your provider will need to remove it on your next visit. He or she may also replace it at that time. You may not need antibiotics to treat a simple abscess, unless the infection is spreading into the skin around the wound (cellulitis).  The wound will take about 1 to 2 weeks to heal, depending on the size of the abscess. Healthy tissue will grow from the bottom and sides of the opening until it seals over.  Home care  These tips can help your wound heal:    The wound may drain for the first 2 days. Cover the wound with a clean dry dressing. Change the dressing if it becomes soaked with blood or pus.    If a gauze packing was placed inside the abscess pocket, you may be told to remove it yourself. You may do this in the shower. Once the packing is removed, you should wash the area in the shower, or clean the area as directed by your provider. Continue to do this until the skin opening has closed. Make sure you wash your hands after changing the packing or cleaning the wound.    If you were prescribed antibiotics, take them as directed until they are all gone.    You may use acetaminophen or ibuprofen to control pain, unless another pain medicine was prescribed. If you have liver disease or ever had a stomach ulcer, talk with your doctor before using these medicines.  Follow-up care  Follow up with your healthcare provider, or as advised. If a gauze  packing was put in your wound, it should be removed in 1 to 2 days. Check your wound every day for any signs that the infection is getting worse. The signs are listed below.  When to seek medical advice  Call your healthcare provider right away if any of these occur:    Increasing redness or swelling    Red streaks in the skin leading away from the wound    Increasing local pain or swelling    Continued pus draining from the wound 2 days after treatment    Fever of 100.4 F (38 C) or higher, or as directed by your healthcare provider    Boil returns when you are at home  Date Last Reviewed: 9/1/2016 2000-2017 [employer]. [address] [place] [address], [place] [address]. All rights reserved. This information is not intended as a substitute for professional medical care. Always follow your healthcare professional's instructions.          Pharyngitis: Strep (Confirmed)    You have had a positive test for strep throat. Strep throat is a contagious illness. It is spread by coughing, kissing or by touching others after touching your mouth or nose. Symptoms include throat pain that is worse with swallowing, aching all over, headache, and fever. It is treated with antibiotic medicine. This should help you start to feel better in 1 to 2 days.  Home care    Rest at home. Drink plenty of fluids to you won't get dehydrated.    No work or school for the first 2 days of taking the antibiotics. After this time, you will not be contagious. You can then return to school or work if you are feeling better.     Take antibiotic medicine for the full 10 days, even if you feel better. This is very important to ensure the infection is treated. It is also important to prevent medicine-resistant germs from developing. If you were given an antibiotic shot, you don't need any more antibiotics.    You may use acetaminophen or ibuprofen to control pain or fever, unless another medicine was prescribed for this. Talk with your doctor  before taking these medicines if you have chronic liver or kidney disease. Also talk with your doctor if you have had a stomach ulcer or GI bleeding.    Throat lozenges or sprays help reduce pain. Gargling with warm saltwater will also reduce throat pain. Dissolve 1/2 teaspoon of salt in 1 glass of warm water. This may be useful just before meals.     Soft foods are OK. Avoid salty or spicy foods.  Follow-up care  Follow up with your healthcare provider or our staff if you don't get better over the next week.  When to seek medical advice  Call your healthcare provider right away if any of these occur:    Fever of 100.4 F (38 C) or higher, or as directed by your healthcare provider    New or worsening ear pain, sinus pain, or headache    Painful lumps in the back of neck    Stiff neck    Lymph nodes getting larger or becoming soft in the middle    You can't swallow liquids or you can't open your mouth wide because of throat pain    Signs of dehydration. These include very dark urine or no urine, sunken eyes, and dizziness.    Trouble breathing or noisy breathing    Muffled voice    Rash  Date Last Reviewed: 4/13/2015 2000-2017 [employer]. [address] [place] [address], [place] [address]. All rights reserved. This information is not intended as a substitute for professional medical care. Always follow your healthcare professional's instructions.

## 2017-08-23 NOTE — ED AVS SNAPSHOT
[facility] Emergency Department    [address]    [facility] [address]    Phone:  [phone]    Fax:  [phone]                                       Kina Walsh   MRN: [id number]    Department:  [facility] Emergency Department   Date of Visit:  8/23/2017           After Visit Summary Signature Page     I have received my discharge instructions, and my questions have been answered. I have discussed any challenges I see with this plan with the nurse or doctor.    ..........................................................................................................................................  Patient/Patient Representative Signature      ..........................................................................................................................................  Patient Representative Print Name and Relationship to Patient    ..................................................               ................................................  Date                                            Time    ..........................................................................................................................................  Reviewed by Signature/Title    ...................................................              ..............................................  Date                                                            Time

## 2017-08-23 NOTE — LETTER
To Whom it may concern:      Kina Walsh was seen in our Emergency Department today, 08/23/17.  I expect her condition to improve over the next  day.  she may return to work/school when improved.    Sincerely,  Jl Sahu RN

## 2017-08-23 NOTE — ED PROVIDER NOTES
"  History     Chief Complaint:  Pharyngitis    HPI   Kina Walsh is a 22 year old female with HIV infection who presents to the Emergency Department for evaluation after three days of pharyngitis with associated cough, nausea and yellow/green productive cough. Additionally the patient expresses a concern regarding a red/inflamed \"bump\" on her right labia. She denies any fevers, ear pain, vomiting, abdominal pain, diarrhea or ill contacts.    Allergies:  No known drug allergies    Medications:    Tyiumeq    Past Medical History:    HIV   Periapical abscess without sinus tract    Past Surgical History:    The patient does not have any pertinent past surgical history.    Family History:    No past pertinent family history.    Social History:  Smoking Status: current everyday smoker  Smokeless Tobacco: never used  Alcohol Use: yes  Marital Status:  Single [1]     Review of Systems   Constitutional: Negative for fever.   HENT: Positive for sore throat. Negative for ear pain.    Respiratory: Positive for cough.    Gastrointestinal: Positive for nausea. Negative for abdominal pain, diarrhea and vomiting.   Skin: Positive for color change.   All other systems reviewed and are negative.    Physical Exam   First Vitals:  BP: 119/72  Pulse: 102  Heart Rate: 102  Temp: 99.2  F (37.3  C)  Resp: 18  SpO2: 100 %    Physical Exam   HENT:   Right Ear: External ear normal.   Left Ear: External ear normal.   Nose: Nose normal.   Mouth/Throat: Oropharyngeal exudate (2+ tonsils erythema exudates bilaterally) present.   Eyes: Right eye exhibits no discharge. Left eye exhibits no discharge. No scleral icterus.   Neck: Normal range of motion.   Cardiovascular: Intact distal pulses.    Pulmonary/Chest: Effort normal. No stridor.   Speaking in full sentences   Genitourinary:   Genitourinary Comments: Exam done with female RN in the room. Right labia majora there is a follicular abscess.Drowningcellulitis   Musculoskeletal:   No " peripheral edema    Lymphadenopathy:     She has cervical adenopathy.   Neurological:   Alert    No gross motor or sensory deficits   Skin: Skin is warm.   Psychiatric: She has a normal mood and affect. Judgment normal.   Nursing note and vitals reviewed.    Emergency Department Course     Procedures:    Procedure: Incision and Drainage   LOCATIONS:  R labia majora     ANESTHESIA:  Local field block using Lidocaine 1% with epinephrine, total of 1 mL's     PREPARATION:  Cleansed with Betadine     PROCEDURE:  Area was incised with # 11 Blade ([place]) with a Single Straight incision.  Wound treatment included Purulent Drainage.  Packing consisted of No Packing.  Appropriate dressing was applied to cover the area.    Patient Status:  Patient tolerated the procedure well. There were no complications.    Interventions:  0321 Augmentin 1 tablet PO  0322 Decadron 10 mg PO    Emergency Department Course:  Nursing notes and vitals reviewed. I performed an exam of the patient as documented above.     The patient's throat was swabbed and this sample was sent for strep evaluation, findings above.    0243 The patient had a pelvic exam performed here in the emergency department, which she tolerated without difficulty. This was done in the presence of a female chaperone.    Incision and drainage as noted above.     I reassessed the patient.     Findings and plan explained to the Patient. Patient discharged home with instructions regarding supportive care, medications, and reasons to return. The importance of close follow-up was reviewed.     Impression & Plan      Medical Decision Making:  Kina Walsh is a 22 year old female here with streptococcal pharyngitis. No signs of complicating infection. She also has a concomitant follicular abscess of the right labia which underwent incision and drainage here there is no significant surrounding cellulitis. I do not think she needs further treatment for this.     Diagnosis:     ICD-10-CM    1. Acute streptococcal pharyngitis J02.0    2. Cutaneous abscess, unspecified site L02.91      Disposition:  discharged to home  Discharge Medications:  New Prescriptions    AMOXICILLIN-CLAVULANATE (AUGMENTIN) 875-125 MG PER TABLET    Take 1 tablet by mouth 2 times daily for 9 doses       IMaricel, am serving as a scribe on 8/23/2017 at 2:36 AM to personally document services performed by Gucci Mccall, * based on my observations and the provider's statements to me.     Maricel Wood  8/23/2017   [facility] EMERGENCY DEPARTMENT       Gucci Mccall MD  08/23/17 0342       Gucci Mccall MD  08/23/17 0342

## 2018-07-08 ENCOUNTER — HEALTH MAINTENANCE LETTER (OUTPATIENT)
Age: 23
End: 2018-07-08

## 2019-01-28 ENCOUNTER — HOSPITAL ENCOUNTER (EMERGENCY)
Facility: CLINIC | Age: 24
Discharge: HOME | End: 2019-01-28
Attending: NURSE PRACTITIONER | Admitting: NURSE PRACTITIONER
Payer: MEDICAID

## 2019-01-28 VITALS
RESPIRATION RATE: 18 BRPM | HEART RATE: 88 BPM | BODY MASS INDEX: 28.43 KG/M2 | HEIGHT: 65 IN | OXYGEN SATURATION: 99 % | SYSTOLIC BLOOD PRESSURE: 122 MMHG | WEIGHT: 170.64 LBS | TEMPERATURE: 98.5 F | DIASTOLIC BLOOD PRESSURE: 77 MMHG

## 2019-01-28 DIAGNOSIS — K04.7 DENTAL INFECTION: ICD-10-CM

## 2019-01-28 DIAGNOSIS — R68.84 JAW PAIN: ICD-10-CM

## 2019-01-28 PROCEDURE — 25000132 ZZH RX MED GY IP 250 OP 250 PS 637: Performed by: NURSE PRACTITIONER

## 2019-01-28 PROCEDURE — 99283 EMERGENCY DEPT VISIT LOW MDM: CPT

## 2019-01-28 RX ORDER — PENICILLIN V POTASSIUM 500 MG/1
500 TABLET, FILM COATED ORAL 4 TIMES DAILY
Qty: 28 TABLET | Refills: 0 | Status: SHIPPED | OUTPATIENT
Start: 2019-01-28 | End: 2019-02-04

## 2019-01-28 RX ORDER — OXYCODONE HYDROCHLORIDE 5 MG/1
5 TABLET ORAL EVERY 6 HOURS PRN
Qty: 12 TABLET | Refills: 0 | Status: SHIPPED | OUTPATIENT
Start: 2019-01-28 | End: 2019-01-31

## 2019-01-28 RX ORDER — OXYCODONE AND ACETAMINOPHEN 5; 325 MG/1; MG/1
1 TABLET ORAL ONCE
Status: COMPLETED | OUTPATIENT
Start: 2019-01-28 | End: 2019-01-28

## 2019-01-28 RX ADMIN — OXYCODONE HYDROCHLORIDE AND ACETAMINOPHEN 1 TABLET: 5; 325 TABLET ORAL at 17:21

## 2019-01-28 ASSESSMENT — MIFFLIN-ST. JEOR: SCORE: 1539.88

## 2019-01-28 ASSESSMENT — ENCOUNTER SYMPTOMS
VOMITING: 0
TROUBLE SWALLOWING: 0
FEVER: 0

## 2019-01-28 NOTE — DISCHARGE INSTRUCTIONS
Take ibuprofen 600 mg every 6 hours for the next 3-5 days or until followup with dentist. If given other medications used as directed. If given antibiotics make sure and finish the entire course. Ultimately you need to follow up with your dentist. If you cannot get into your dentist in the next couple of days I have given you a list of other options for dental care. You will need to get further pain management from your dentist. Return to emergency room if you develop high fevers, difficulty breathing, shortness of breath, the inability to swallow your own saliva, or for other concerns.     [facility]  [email or web address]   [address]   Directions   [phone]     [facility]    [email or web address]  Emergency Dental Clinic You Can Rely On. Open 24 Hours. Call Now.  [address] [facility] [address]   Directions   [phone]     [facility]  Address: [address] [place] [address], [address], [place] [address]   Phone: [phone]     Dental Clinics Accepting [facility]     Child and Teen Checkups  [facility]  [phone]     [facility]  [address] [facility] [address]  [place], [place]  [phone]     [facility] [address]  [phone]     [facility]  [address] [facility]  [phone]     [facility]  [address] [facility]  [phone]     [facility]  [address] [facility]  [phone]     [place] [address] [facility]  [phone]     [facility]  [address]  [phone]     [facility]  [place]  [phone]     [facility]  [address] [place] [address]  [phone]     [facility]  [address] [facility]  [phone]     [facility]  [address] [place] [address]  [phone]     [facility]  [address]  [facility]  [phone]     [facility]  [address] [facility]  [phone]     ADDITIONAL RESOURCES     [facility]  [phone]     [facility]  [phone]     [facility]  [phone]     This web site contains many locations and information about what services they provide:     [email or web address]

## 2019-01-28 NOTE — ED AVS SNAPSHOT
[facility] Emergency Department  [address]  [facility] [address]  Phone:  [phone]  Fax:  [phone]                                    Kina Walsh   MRN: [id number]    Department:  [facility] Emergency Department   Date of Visit:  1/28/2019           After Visit Summary Signature Page    I have received my discharge instructions, and my questions have been answered. I have discussed any challenges I see with this plan with the nurse or doctor.    ..........................................................................................................................................  Patient/Patient Representative Signature      ..........................................................................................................................................  Patient Representative Print Name and Relationship to Patient    ..................................................               ................................................  Date                                   Time    ..........................................................................................................................................  Reviewed by Signature/Title    ...................................................              ..............................................  Date                                               Time          22EPIC Rev 08/18

## 2019-01-28 NOTE — ED PROVIDER NOTES
"  History     Chief Complaint:  Dental pain    HPI   Kina Walsh is a 23 year old female who presents to the emergency department today for evaluation of dental pain. For the past month the patient has been having left upper and lower dental pain that has progressively worsened within the past week. She endorses using prescription ibuprofen 800 mg every 4 hours. The patient has not seen a doctor or dentist to figure out why she is having this dental pain. She is swallowing and breathing okay. Patient denies fever, vomiting, or drainage.    Allergies:  No Known Drug Allergies    Medications:    Medications reviewed. No pertinent medications.    Past Medical History:    Asymptomatic HIV    Past Surgical History:     section  Incision and drainage left bartholin's cyst    Family History:    Family history reviewed. No pertinent family history.    Social History:  The patient was accompanied to the ED by herself.  Smoking Status: Current every day smoker  Smokeless Tobacco: Never Used  Alcohol Use: Positive  Drug Use: Negative  Marital Status:  Single     Review of Systems   Constitutional: Negative for fever.   HENT: Positive for dental problem. Negative for trouble swallowing.         Negative drainage in the mouth   Respiratory:        Negative difficulty breathing   Gastrointestinal: Negative for vomiting.   All other systems reviewed and are negative.      Physical Exam     Patient Vitals for the past 24 hrs:   BP Temp Temp src Pulse Heart Rate Resp SpO2 Height Weight   19 1701 122/77 98.5  F (36.9  C) Oral 88 88 18 99 % 1.651 m (5' 5\") 77.4 kg (170 lb 10.2 oz)       Physical Exam  General: Well-nourished, appears to be in pain  Eyes: PERRL, conjunctivae pink no scleral icterus or conjunctival injection  ENT:  Moist mucus membranes.  Tenderness with palpation of left upper and lower teeth. Unable to isolate one tooth.  No drainage.  No abscess along gumline. No intraoral lesions.  No cheek or " submandibular edema.  No trismus.  Normal voice.  Respiratory:  No respiratory distress  CV: Normal rate   Skin: Warm, dry.  No rashes or petechiae  Musculoskeletal: No peripheral edema or calf tenderness  Neuro: Alert and oriented to person/place/time  Psychiatric: Normal affect    Emergency Department Course     Interventions:  1721 Percocet 1 tablet PO    Emergency Department Course:    1703 Nursing notes and vitals reviewed.    1707 I performed an exam of the patient as documented above.     1736 I personally answered all related questions prior to discharge.    Impression & Plan      Medical Decision Making:  Kina Walsh is a 23 year old female who presents for evaluation of jaw pain.  This appears to be secondary to a dental issue. There is no abscess detected around the tooth amenable to incision and drainage. The differential diagnosis includes: cracked tooth syndrome, pulpitis, sub-apical abscess, facial cellulitis, alveolitis amongst others. There is no evidence of deep space infections, significant facial swelling, Lemierre's Syndrome or Bruno's angina. There are no posterior pharyngeal space (RPA, PTA) infections detected.    Patient declined dental block.  I do not feel there is any drug seeking behavior.   reveals no prescriptions in the past year. Follow up with a dentist/endodontist in the coming days is indicated for further work up and treatment.  Patient was given list of free and emergency dental clinics.   Will start pain medication and antibiotics. Discussed no alcohol, driving or operating machinery while taking pain medications and that they will not be refilled from the ED.  Reasons to return to ED discussed including fevers, difficulty swallowing, increased swelling or redness to face or neck or any further concerns.      Diagnosis:    ICD-10-CM    1. Jaw pain R68.84    2. Dental infection K04.7      Disposition:   The patient is discharged to home.    Discharge Medications:         START taking      Dose / Directions   oxyCODONE 5 MG tablet  Commonly known as:  ROXICODONE      Dose:  5 mg  Take 1 tablet (5 mg) by mouth every 6 hours as needed for pain  Quantity:  12 tablet  Refills:  0     penicillin V 500 MG tablet  Commonly known as:  VEETID      Dose:  500 mg  Take 1 tablet (500 mg) by mouth 4 times daily for 7 days  Quantity:  28 tablet  Refills:  0           Where to get your medicines      Some of these will need a paper prescription and others can be bought over the counter. Ask your nurse if you have questions.    Bring a paper prescription for each of these medications    oxyCODONE 5 MG tablet    penicillin V 500 MG tablet         Scribe Disclosure:  I, Pao Ho, am serving as a scribe at 5:11 PM on 1/28/2019 to document services personally performed by Odessa Cota APRN based on my observations and the provider's statements to me.    [facility] EMERGENCY DEPARTMENT       Odessa Cota APRN CNP  01/28/19 9528

## 2019-01-28 NOTE — ED TRIAGE NOTES
Patient presents with complaints of left sided dental pain. Pain started one month ago. Patient has not seen a dentist yet. Last took Ibuprofen at 1300. ABC intact without need for intervention at this time.

## 2019-11-04 ENCOUNTER — HEALTH MAINTENANCE LETTER (OUTPATIENT)
Age: 24
End: 2019-11-04

## 2020-11-16 ENCOUNTER — HEALTH MAINTENANCE LETTER (OUTPATIENT)
Age: 25
End: 2020-11-16

## 2021-09-18 ENCOUNTER — HEALTH MAINTENANCE LETTER (OUTPATIENT)
Age: 26
End: 2021-09-18

## 2022-01-08 ENCOUNTER — HEALTH MAINTENANCE LETTER (OUTPATIENT)
Age: 27
End: 2022-01-08

## 2022-02-15 ENCOUNTER — HOSPITAL ENCOUNTER (EMERGENCY)
Facility: CLINIC | Age: 27
Discharge: HOME | End: 2022-02-15
Attending: INTERNAL MEDICINE | Admitting: INTERNAL MEDICINE
Payer: COMMERCIAL

## 2022-02-15 VITALS
RESPIRATION RATE: 16 BRPM | DIASTOLIC BLOOD PRESSURE: 62 MMHG | TEMPERATURE: 98.4 F | HEART RATE: 76 BPM | SYSTOLIC BLOOD PRESSURE: 112 MMHG | OXYGEN SATURATION: 99 %

## 2022-02-15 DIAGNOSIS — R21 RASH: ICD-10-CM

## 2022-02-15 PROCEDURE — 99283 EMERGENCY DEPT VISIT LOW MDM: CPT

## 2022-02-15 RX ORDER — FLUCONAZOLE 150 MG/1
150 TABLET ORAL
Qty: 4 TABLET | Refills: 0 | Status: SHIPPED | OUTPATIENT
Start: 2022-02-15

## 2022-02-15 RX ORDER — TRIAMCINOLONE ACETONIDE 1 MG/G
CREAM TOPICAL
Qty: 30 G | Refills: 0 | Status: SHIPPED | OUTPATIENT
Start: 2022-02-15 | End: 2022-03-01

## 2022-02-16 NOTE — ED PROVIDER NOTES
History     Chief Complaint:  Rash       HPI   Kina Walsh is a 26 year old female who presents with a rash.  She says this is been present since early December and getting worse and spreading.  It is very itchy.  No one else has a similar rash and she has not had anything like it before.  No new medications, laundry detergents, or other exposures that she can think of..    ROS:  Review of Systems   Skin: Positive for rash.       Allergies:  No Known Drug Allergies     Medications:    fluconazole (DIFLUCAN) 150 MG tablet  triamcinolone (KENALOG) 0.1 % external cream  abacavir-dolutegravir-LamiVUDine (TRIUMEQ) 600- MG per tablet  abacavir-dolutegravir-LamiVUDine (TRIUMEQ) 600- MG per tablet        Past Medical History:    Past Medical History:   Diagnosis Date     Asymptomatic human immunodeficiency virus (HIV) infection status (H)      Patient Active Problem List   Diagnosis     Human immunodeficiency virus (HIV) infection (H)     Periapical abscess without sinus tract        Past Surgical History:    No past surgical history on file.     Family History:    family history is not on file.    Social History:   reports that she has been smoking. She has never used smokeless tobacco. She reports current alcohol use. She reports that she does not use drugs.  PCP: [facility]     Physical Exam     Patient Vitals for the past 24 hrs:   BP Temp Temp src Pulse Resp SpO2   02/15/22 2312 112/62 -- -- 76 16 99 %   02/15/22 2238 121/63 98.4  F (36.9  C) Temporal 87 12 100 %        Physical Exam  Skin:     Findings: Rash present. Rash is scaling.      Comments: Widespread, erythematous and excoriated papules scattered over the arms and trunk.  Some have silver scaling.  Others appear to perhaps have had central umbilication.   Neurological:      Mental Status: She is alert.         Emergency Department Course     Emergency Department Course:               Impression & Plan         Medical Decision Making:    Kina Walsh is a 26 year old female who presents to the emergency department with a rash.  The appearance was most suggestive to me of a guttate psoriasis, though I considered a broad differential.  I think this could also be consistent with a tinea corporis and I think it is reasonable to start treatment pending follow-up with dermatology.  I will discharge the patient on fluconazole and triamcinolone as needed, schedule prompt follow-up with dermatology, return if problems.      Diagnosis:    ICD-10-CM    1. Rash  R21         Discharge Medications:  Discharge Medication List as of 2/15/2022 11:10 PM      START taking these medications    Details   fluconazole (DIFLUCAN) 150 MG tablet Take 1 tablet (150 mg) by mouth every 7 days, Disp-4 tablet, R-0, Local Print      triamcinolone (KENALOG) 0.1 % external cream Apply to rash twice a day as needed. Do not use on the face.Disp-30 g, R-0Local Print              2/15/2022   MD Justo Penaloza, Heather Cramer MD  02/15/22 1799

## 2022-11-20 ENCOUNTER — HEALTH MAINTENANCE LETTER (OUTPATIENT)
Age: 27
End: 2022-11-20

## 2023-04-15 ENCOUNTER — HEALTH MAINTENANCE LETTER (OUTPATIENT)
Age: 28
End: 2023-04-15

## 2023-08-22 ENCOUNTER — LAB REQUISITION (OUTPATIENT)
Dept: LAB | Facility: CLINIC | Age: 28
End: 2023-08-22

## 2023-08-22 DIAGNOSIS — Z21 ASYMPTOMATIC HUMAN IMMUNODEFICIENCY VIRUS (HIV) INFECTION STATUS (H): ICD-10-CM

## 2023-08-22 LAB
ALBUMIN SERPL BCG-MCNC: 4.4 G/DL (ref 3.5–5.2)
ALP SERPL-CCNC: 96 U/L (ref 35–104)
ALT SERPL W P-5'-P-CCNC: 11 U/L (ref 0–50)
ANION GAP SERPL CALCULATED.3IONS-SCNC: 12 MMOL/L (ref 7–15)
AST SERPL W P-5'-P-CCNC: 19 U/L (ref 0–45)
BILIRUB SERPL-MCNC: 0.3 MG/DL
BUN SERPL-MCNC: 7.5 MG/DL (ref 6–20)
CALCIUM SERPL-MCNC: 9.3 MG/DL (ref 8.6–10)
CHLORIDE SERPL-SCNC: 105 MMOL/L (ref 98–107)
CHOLEST SERPL-MCNC: 185 MG/DL
CREAT SERPL-MCNC: 0.65 MG/DL (ref 0.51–0.95)
DEPRECATED HCO3 PLAS-SCNC: 23 MMOL/L (ref 22–29)
GFR SERPL CREATININE-BSD FRML MDRD: >90 ML/MIN/1.73M2
GLUCOSE SERPL-MCNC: 94 MG/DL (ref 70–99)
HDLC SERPL-MCNC: 45 MG/DL
LDLC SERPL CALC-MCNC: 118 MG/DL
NONHDLC SERPL-MCNC: 140 MG/DL
POTASSIUM SERPL-SCNC: 4.4 MMOL/L (ref 3.4–5.3)
PROT SERPL-MCNC: 7.6 G/DL (ref 6.4–8.3)
SODIUM SERPL-SCNC: 140 MMOL/L (ref 136–145)
TRIGL SERPL-MCNC: 108 MG/DL
TSH SERPL DL<=0.005 MIU/L-ACNC: 2.09 UIU/ML (ref 0.3–4.2)

## 2023-08-22 PROCEDURE — 86360 T CELL ABSOLUTE COUNT/RATIO: CPT | Performed by: INTERNAL MEDICINE

## 2023-08-22 PROCEDURE — 80053 COMPREHEN METABOLIC PANEL: CPT | Performed by: INTERNAL MEDICINE

## 2023-08-22 PROCEDURE — 80061 LIPID PANEL: CPT | Performed by: INTERNAL MEDICINE

## 2023-08-22 PROCEDURE — 84443 ASSAY THYROID STIM HORMONE: CPT | Performed by: INTERNAL MEDICINE

## 2023-08-23 LAB
CD3 CELLS # BLD: 1352 CELLS/UL (ref 603–2990)
CD3 CELLS NFR BLD: 75 % (ref 49–84)
CD3+CD4+ CELLS # BLD: 686 CELLS/UL (ref 441–2156)
CD3+CD4+ CELLS NFR BLD: 38 % (ref 28–63)
CD3+CD4+ CELLS/CD3+CD8+ CLL BLD: 1.1 % (ref 1.4–2.6)
CD3+CD8+ CELLS # BLD: 622 CELLS/UL (ref 125–1312)
CD3+CD8+ CELLS NFR BLD: 34 % (ref 10–40)
T CELL COMMENT: ABNORMAL

## 2023-11-28 ENCOUNTER — LAB REQUISITION (OUTPATIENT)
Dept: LAB | Facility: CLINIC | Age: 28
End: 2023-11-28
Payer: COMMERCIAL

## 2023-11-28 DIAGNOSIS — J02.9 ACUTE PHARYNGITIS, UNSPECIFIED: ICD-10-CM

## 2023-11-28 DIAGNOSIS — Z00.00 ENCOUNTER FOR GENERAL ADULT MEDICAL EXAMINATION WITHOUT ABNORMAL FINDINGS: ICD-10-CM

## 2023-11-28 DIAGNOSIS — Z21 ASYMPTOMATIC HUMAN IMMUNODEFICIENCY VIRUS (HIV) INFECTION STATUS (H): ICD-10-CM

## 2023-11-28 LAB
BASOPHILS # BLD AUTO: 0.1 10E3/UL (ref 0–0.2)
BASOPHILS NFR BLD AUTO: 1 %
EOSINOPHIL # BLD AUTO: 0.1 10E3/UL (ref 0–0.7)
EOSINOPHIL NFR BLD AUTO: 1 %
ERYTHROCYTE [DISTWIDTH] IN BLOOD BY AUTOMATED COUNT: 12.9 % (ref 10–15)
HCT VFR BLD AUTO: 36.4 % (ref 35–47)
HGB BLD-MCNC: 12.2 G/DL (ref 11.7–15.7)
HOLD SPECIMEN: NORMAL
IMM GRANULOCYTES # BLD: 0 10E3/UL
IMM GRANULOCYTES NFR BLD: 0 %
LYMPHOCYTES # BLD AUTO: 1.6 10E3/UL (ref 0.8–5.3)
LYMPHOCYTES NFR BLD AUTO: 23 %
MCH RBC QN AUTO: 26.3 PG (ref 26.5–33)
MCHC RBC AUTO-ENTMCNC: 33.5 G/DL (ref 31.5–36.5)
MCV RBC AUTO: 79 FL (ref 78–100)
MONOCYTES # BLD AUTO: 0.5 10E3/UL (ref 0–1.3)
MONOCYTES NFR BLD AUTO: 7 %
NEUTROPHILS # BLD AUTO: 4.7 10E3/UL (ref 1.6–8.3)
NEUTROPHILS NFR BLD AUTO: 68 %
NRBC # BLD AUTO: 0 10E3/UL
NRBC BLD AUTO-RTO: 0 /100
PLATELET # BLD AUTO: 228 10E3/UL (ref 150–450)
RBC # BLD AUTO: 4.63 10E6/UL (ref 3.8–5.2)
WBC # BLD AUTO: 6.9 10E3/UL (ref 4–11)

## 2023-11-28 PROCEDURE — 86803 HEPATITIS C AB TEST: CPT | Mod: ORL | Performed by: INTERNAL MEDICINE

## 2023-11-28 PROCEDURE — 85025 COMPLETE CBC W/AUTO DIFF WBC: CPT | Mod: ORL | Performed by: INTERNAL MEDICINE

## 2023-11-28 PROCEDURE — 87491 CHLMYD TRACH DNA AMP PROBE: CPT | Mod: ORL | Performed by: INTERNAL MEDICINE

## 2023-11-28 PROCEDURE — 87536 HIV-1 QUANT&REVRSE TRNSCRPJ: CPT | Mod: ORL | Performed by: INTERNAL MEDICINE

## 2023-11-28 PROCEDURE — 87591 N.GONORRHOEAE DNA AMP PROB: CPT | Mod: ORL | Performed by: INTERNAL MEDICINE

## 2023-11-29 LAB
C TRACH DNA SPEC QL NAA+PROBE: NEGATIVE
HCV AB SERPL QL IA: NONREACTIVE
N GONORRHOEA DNA SPEC QL NAA+PROBE: NEGATIVE

## 2023-11-30 LAB — HIV1 RNA # PLAS NAA DL=20: NOT DETECTED COPIES/ML

## 2024-06-16 ENCOUNTER — HEALTH MAINTENANCE LETTER (OUTPATIENT)
Age: 29
End: 2024-06-16